# Patient Record
Sex: FEMALE | Race: WHITE | NOT HISPANIC OR LATINO | Employment: FULL TIME | URBAN - METROPOLITAN AREA
[De-identification: names, ages, dates, MRNs, and addresses within clinical notes are randomized per-mention and may not be internally consistent; named-entity substitution may affect disease eponyms.]

---

## 2017-11-22 ENCOUNTER — COMMUNICATION - HEALTHEAST (OUTPATIENT)
Dept: FAMILY MEDICINE | Facility: CLINIC | Age: 57
End: 2017-11-22

## 2017-11-22 DIAGNOSIS — I10 ESSENTIAL HYPERTENSION: ICD-10-CM

## 2019-08-21 ASSESSMENT — MIFFLIN-ST. JEOR: SCORE: 1390.54

## 2019-08-24 ENCOUNTER — ANESTHESIA - HEALTHEAST (OUTPATIENT)
Dept: SURGERY | Facility: CLINIC | Age: 59
End: 2019-08-24

## 2019-08-26 ENCOUNTER — AMBULATORY - HEALTHEAST (OUTPATIENT)
Dept: OTHER | Facility: CLINIC | Age: 59
End: 2019-08-26

## 2019-08-26 ENCOUNTER — SURGERY - HEALTHEAST (OUTPATIENT)
Dept: SURGERY | Facility: CLINIC | Age: 59
End: 2019-08-26

## 2019-08-26 ASSESSMENT — MIFFLIN-ST. JEOR
SCORE: 1368.78
SCORE: 1367.59

## 2021-01-29 ENCOUNTER — OFFICE VISIT (OUTPATIENT)
Dept: NEUROLOGY | Facility: CLINIC | Age: 61
End: 2021-01-29
Payer: COMMERCIAL

## 2021-01-29 DIAGNOSIS — G57.02 COMMON PERONEAL NEUROPATHY OF LEFT LOWER EXTREMITY: Primary | ICD-10-CM

## 2021-01-29 PROCEDURE — 95886 MUSC TEST DONE W/N TEST COMP: CPT | Mod: LT | Performed by: PSYCHIATRY & NEUROLOGY

## 2021-01-29 PROCEDURE — 95908 NRV CNDJ TST 3-4 STUDIES: CPT | Performed by: PSYCHIATRY & NEUROLOGY

## 2021-01-29 NOTE — LETTER
1/29/2021         RE: Eliza Wakefield  [address] [address]        Dear Colleague,    Thank you for referring your patient, Eliza Wakefield, to the [facility] [place]. Please see a copy of my visit note below.    Epic requires a note here      Again, thank you for allowing me to participate in the care of your patient.        Sincerely,        Yonathan Morgan MD

## 2021-01-29 NOTE — PROCEDURES
[facility]    Formerly [facility]  [address], [address]  [place] [address]  Tel: [phone]  Fax: [phone]          Full Name: Eliza Wakefield Gender: Female  Patient ID: [id number] YOB: 1960      Visit Date: 1/29/2021 09:49  Age: 60 Years 4 Months Old  Interpreted By: Yonathan Morgan MD   Ref Dr.: Viral Flor NP  Tech: ST   Height: 5 feet 5 inch  Reason for referral: Evaluate left lower. c/o weakness in left leg/foot, drop foot > 2 months.        Motor NCS      Nerve / Sites Lat Amp Dist Ulises    ms mV cm m/s   L Peroneal - EDB      Ankle 4.90 6.4 8       Fib head 11.09 5.8 29 47      Pop fossa 14.48 5.4 10 30   L Tibial - AH      Ankle 4.90 11.5 8       Pop fossa 12.71 11.2 38 49       F  Wave      Nerve Fmin    ms   L Tibial - AH 49.69       Sensory NCS      Nerve / Sites Onset Lat Pk Lat PP Amp Dist    ms ms  V cm   L Sural - Ankle (Calf)      Calf 3.07 3.96 14.2 14   L Superficial peroneal - Ankle      Lat leg 2.60 3.18 6.6 12       EMG Summary Table     Spontaneous MUAP Rcmt Note   Muscle Fib PSW Fasc IA # Amp Dur PPP Rate Type   L. Gluteus medius None None None N N N N N N N   L. Gluteus maggie None None None N N N N N N N   L. L3 paraspinal None None None N N N N N N N   L. L4 paraspinal None None None N N N N N N N   L. L5 paraspinal None None None N N N N N N N   L. S1 paraspinal None None None N N N N N N N   L. Adductor patrick None None None N N N N N N N   L. Quadriceps None None None N N N N N N N   L. Gastrocnemius (Medial head) None None None N N N N N N N   L. Gastrocnemius (Lateral head) None None None N N N N N N N   L. Tibialis posterior None None None N N N N N N N   L. Tibialis anterior 1+ 1+ None N N N N N N N   L. Peroneus longus 1+ 1+ None N N N N N N N     History:  This patient is a 60-year-old woman with some mild left foot drop for a few months now.  This EMG is performed to evaluate for nerve or  root impingement.  Exam shows mild weakness of ankle dorsiflexors and foot everters.    Findings:  Motor nerve conduction study of the left peroneal nerve shows marked slowing across the fibular head without significant amplitude decrement.  Distal motor latency and conduction through the lower leg are fine.  Left tibial motor study is well within normal limits.    F-wave latency in the left tibial nerve is normal.    Sensory studies of the left sural nerve are normal.  Sensory study of the left superficial peroneal nerve shows a borderline low amplitude with normal latency.    Needle EMG of selected muscles throughout the left leg including lumbar paraspinal muscles showed only very mild fibrillations and positive sharp waves in the tibialis anterior and peroneus longus muscles.  There was no spontaneous activity in the paraspinal muscles.  Motor unit action potentials were of normal configuration and recruitment including the peroneal innervated muscles.    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    Comment:  Without significant denervation changes on needle EMG testing I am hopeful that she can recover well from this.  I talked to her a bit about avoiding pressure on that lateral left knee.

## 2021-03-07 ENCOUNTER — HEALTH MAINTENANCE LETTER (OUTPATIENT)
Age: 61
End: 2021-03-07

## 2021-04-02 ENCOUNTER — COMMUNICATION - HEALTHEAST (OUTPATIENT)
Dept: FAMILY MEDICINE | Facility: CLINIC | Age: 61
End: 2021-04-02

## 2021-05-25 ENCOUNTER — RECORDS - HEALTHEAST (OUTPATIENT)
Dept: ADMINISTRATIVE | Facility: CLINIC | Age: 61
End: 2021-05-25

## 2021-05-26 ENCOUNTER — RECORDS - HEALTHEAST (OUTPATIENT)
Dept: ADMINISTRATIVE | Facility: CLINIC | Age: 61
End: 2021-05-26

## 2021-05-27 ENCOUNTER — RECORDS - HEALTHEAST (OUTPATIENT)
Dept: ADMINISTRATIVE | Facility: CLINIC | Age: 61
End: 2021-05-27

## 2021-05-28 ENCOUNTER — RECORDS - HEALTHEAST (OUTPATIENT)
Dept: ADMINISTRATIVE | Facility: CLINIC | Age: 61
End: 2021-05-28

## 2021-05-29 ENCOUNTER — RECORDS - HEALTHEAST (OUTPATIENT)
Dept: ADMINISTRATIVE | Facility: CLINIC | Age: 61
End: 2021-05-29

## 2021-05-31 NOTE — ANESTHESIA PROCEDURE NOTES
Spinal Block    Patient location during procedure: OR  Start time: 8/26/2019 7:36 AM  End time: 8/26/2019 7:42 AM  Reason for block: primary anesthetic    Staffing:  Performing  Anesthesiologist: Rashel Mcgrath MD    Preanesthetic Checklist  Completed: patient identified, risks, benefits, and alternatives discussed, timeout performed, consent obtained, at patient's request, airway assessed, oxygen available, suction available, emergency drugs available and hand hygiene performed  Spinal Block  Patient position: sitting  Prep: ChloraPrep  Patient monitoring: heart rate, cardiac monitor, continuous pulse ox and blood pressure  Approach: midline  Location: L3-4  Injection technique: single-shot  Needle type: pencil-tip   Needle gauge: 24 G    Assessment  Sensory level: T8

## 2021-05-31 NOTE — ANESTHESIA CARE TRANSFER NOTE
Last vitals:   Vitals:    08/26/19 0932   BP: 109/59   Pulse: 80   Resp: 16   Temp: 36.5  C (97.7  F)   SpO2: 99%     Patient's level of consciousness is drowsy  Spontaneous respirations: yes  Maintains airway independently: yes  Dentition unchanged: yes  Oropharynx: oropharynx clear of all foreign objects    QCDR Measures:  ASA# 20 - Surgical Safety Checklist: WHO surgical safety checklist completed prior to induction    PQRS# 430 - Adult PONV Prevention: 4558F - Pt received => 2 anti-emetic agents (different classes) preop & intraop  ASA# 8 - Peds PONV Prevention: NA - Not pediatric patient, not GA or 2 or more risk factors NOT present  PQRS# 424 - Lynn-op Temp Management: 4559F - At least one body temp DOCUMENTED => 35.5C or 95.9F within required timeframe  PQRS# 426 - PACU Transfer Protocol: - Transfer of care checklist used  ASA# 14 - Acute Post-op Pain: ASA14B - Patient did NOT experience pain >= 7 out of 10

## 2021-05-31 NOTE — ANESTHESIA PREPROCEDURE EVALUATION
Anesthesia Evaluation      Patient summary reviewed   No history of anesthetic complications     Airway   Mallampati: II  Neck ROM: full   Pulmonary - negative ROS and normal exam                          Cardiovascular - normal exam  (+) hypertension, , hypercholesterolemia,     ECG reviewed        Neuro/Psych    (+) neuromuscular disease,      Endo/Other    (+) arthritis, obesity,      GI/Hepatic/Renal    (+) GERD well controlled,             Dental - normal exam   (+) bridge                       Anesthesia Plan  Planned anesthetic: spinal    ASA 2     Anesthetic plan and risks discussed with: patient and spouse    Post-op plan: routine recovery

## 2021-06-03 VITALS — BODY MASS INDEX: 28.94 KG/M2 | HEIGHT: 65 IN | WEIGHT: 173.7 LBS

## 2021-06-16 PROBLEM — M16.11 PRIMARY OSTEOARTHRITIS OF RIGHT HIP: Status: ACTIVE | Noted: 2019-08-26

## 2021-06-16 NOTE — TELEPHONE ENCOUNTER
Patient has been going to a new provider for some time now. The patient states that she will contact the central [facility] Office to have her medical records sent to her new clinic.

## 2021-06-16 NOTE — TELEPHONE ENCOUNTER
Chart review for quality metrics indicates that patient is due/overdue for AWV. ALSO OVERDUE FOR Hep C/HIV screen, Covid vaccine, CRC screening, Zoster vaccine, MMG, Pap, HPV test and TD vaccine. Please schedule patient for services indicated.

## 2021-06-19 NOTE — LETTER
Letter by Gayle Traylor RN at      Author: Gayle Traylor RN Service: -- Author Type: --    Filed:  Encounter Date: 8/26/2019 Status: (Other)       Honoring Choices Advance Care Planning  [facility] [place] [address]    8/26/2019    Eliza Wakefield  [address] [place] [id number]    Dear Ms. Wakefield,    We have reviewed the Health Care Directive dated 8/26/19 which you presented for addition to   your medical record. Unfortunately, our review indicates the document is not legally valid for   the following reason(s): A witness or notary are named as your health care agent.  In order to create a legal document you will need to have your signature re-witnessed or have it notarized. Notaries and witnesses cannot be named as your health care agents per state law. In addition, only one of your 2 witnesses can be employed by our health care system.     We greatly value the opportunity to assist you in documenting your choices and to honor your   wishes. We apologize for any inconvenience. We have several options to assist you in updating   your document so it meets legal requirements.       Health Care Directives and Advance Care Planning resources can be viewed and printed   for free at our web site:[email or web address].       Free group classes on Advance Care Planning and completing a Health Care Directive are  available at multiple locations and times. These classes are led by trained staff who will   provide information and guide you through a Health Care Directive. They can also review, notarize and add your Health Care Directive to your medical record. [place] for a class at [email or web address] or by calling [employer] [facility] at [phone] or toll free [phone].      COPIES of completed Health Care Directives can be brought or mailed to any of our   locations, including the address listed below. You can also email a  copy to [email or web address] .       For additional assistance or questions you can email us at [email or web address] or call [phone]      Sincerely,     Honoring [place] Advance Care Planning  [facility], and [place]  [address] [place] [address]

## 2021-07-03 NOTE — ADDENDUM NOTE
Addendum Note by Tammy Goldman CRNA at 8/26/2019 10:52 AM     Author: Tammy Goldman CRNA Service: -- Author Type: Nurse Anesthetist    Filed: 8/26/2019 10:52 AM Date of Service: 8/26/2019 10:52 AM Status: Signed    : Tammy Goldman CRNA (Nurse Anesthetist)       Addendum  created 08/26/19 1052 by Tammy Goldman CRNA    Flowsheet data copied forward, Intraprocedure Flowsheets edited

## 2021-07-13 ENCOUNTER — RECORDS - HEALTHEAST (OUTPATIENT)
Dept: ADMINISTRATIVE | Facility: CLINIC | Age: 61
End: 2021-07-13

## 2021-07-21 ENCOUNTER — RECORDS - HEALTHEAST (OUTPATIENT)
Dept: ADMINISTRATIVE | Facility: CLINIC | Age: 61
End: 2021-07-21

## 2021-10-11 ENCOUNTER — HEALTH MAINTENANCE LETTER (OUTPATIENT)
Age: 61
End: 2021-10-11

## 2022-01-10 ENCOUNTER — TRANSFERRED RECORDS (OUTPATIENT)
Dept: HEALTH INFORMATION MANAGEMENT | Facility: CLINIC | Age: 62
End: 2022-01-10
Payer: COMMERCIAL

## 2022-01-14 ENCOUNTER — TRANSCRIBE ORDERS (OUTPATIENT)
Dept: OTHER | Age: 62
End: 2022-01-14
Payer: COMMERCIAL

## 2022-01-14 DIAGNOSIS — H21.569: Primary | ICD-10-CM

## 2022-03-01 ENCOUNTER — OFFICE VISIT (OUTPATIENT)
Dept: OPHTHALMOLOGY | Facility: CLINIC | Age: 62
End: 2022-03-01
Attending: OPHTHALMOLOGY
Payer: COMMERCIAL

## 2022-03-01 DIAGNOSIS — H57.051: Primary | ICD-10-CM

## 2022-03-01 DIAGNOSIS — H53.10 SUBJECTIVE VISUAL DISTURBANCE: Primary | ICD-10-CM

## 2022-03-01 PROCEDURE — G0463 HOSPITAL OUTPT CLINIC VISIT: HCPCS | Performed by: TECHNICIAN/TECHNOLOGIST

## 2022-03-01 PROCEDURE — 99204 OFFICE O/P NEW MOD 45 MIN: CPT | Performed by: OPHTHALMOLOGY

## 2022-03-01 RX ORDER — ATORVASTATIN CALCIUM 40 MG/1
1 TABLET, FILM COATED ORAL EVERY 24 HOURS
COMMUNITY
Start: 2021-06-11

## 2022-03-01 RX ORDER — OMEPRAZOLE 10 MG/1
CAPSULE, DELAYED RELEASE ORAL
COMMUNITY

## 2022-03-01 RX ORDER — CARVEDILOL 25 MG/1
1 TABLET ORAL EVERY 12 HOURS
COMMUNITY
Start: 2021-06-11

## 2022-03-01 RX ORDER — CHLORTHALIDONE 25 MG/1
0.5 TABLET ORAL EVERY 24 HOURS
COMMUNITY
Start: 2021-06-11

## 2022-03-01 RX ORDER — LISINOPRIL 5 MG/1
1 TABLET ORAL EVERY 24 HOURS
COMMUNITY
Start: 2021-06-11

## 2022-03-01 ASSESSMENT — VISUAL ACUITY
OS_PH_CC+: -3
CORRECTION_TYPE: GLASSES
OS_CC: 20/25
OD_CC: 20/20
METHOD: SNELLEN - LINEAR
OS_PH_CC: 20/20

## 2022-03-01 ASSESSMENT — REFRACTION_WEARINGRX
OD_SPHERE: +0.75
OS_AXIS: 004
OD_CYLINDER: +0.75
OS_ADD: +2.50
OD_ADD: +2.50
SPECS_TYPE: PAL
OD_AXIS: 155
OS_SPHERE: PLANO
OS_CYLINDER: +0.25

## 2022-03-01 ASSESSMENT — SLIT LAMP EXAM - LIDS
COMMENTS: NORMAL
COMMENTS: NORMAL

## 2022-03-01 ASSESSMENT — CONF VISUAL FIELD
OS_NORMAL: 1
OD_NORMAL: 1
METHOD: COUNTING FINGERS

## 2022-03-01 ASSESSMENT — TONOMETRY
OS_IOP_MMHG: 12
OD_IOP_MMHG: 14
IOP_METHOD: ICARE

## 2022-03-01 ASSESSMENT — EXTERNAL EXAM - RIGHT EYE: OD_EXAM: NORMAL

## 2022-03-01 ASSESSMENT — EXTERNAL EXAM - LEFT EYE: OS_EXAM: NORMAL

## 2022-03-01 NOTE — LETTER
3/1/2022         RE:  :  MRN: Eliza Wakefield  1960  [id number]     Dear Dr. Aguilar,    Thank you for asking me to see your very pleasant patient, Eliza Wakefield, in neuro-ophthalmic consultation.  I would like to thank you for sending your records and I have summarized them in the history of present illness. My assessment and plan are below.  For further details, please see my attached clinic note.      Assessment & Plan     Eliza Wakefield is a 61 year old female with the following diagnoses:   1. Tonic pupillary reaction of right eye         Patient was sent for consultation by Dr. Oscar Aguilar for pupillary abnormality     HPI:  On 1/10/22 had routine eye exam with Dr. Fonseca.  She had fallen off her snow mobile 4 days earlier.  She doesn't think she hit her head.  She hit the left shoulder on the ground and there was a scrape on her helmet visor.  She was told that her right pupil was large. She denies double vision.  She denies ptosis. Dr. Aguilar sent her for an MRI, which was unchanged.  She had a stroke in  where she had a funny feeling on her right arm and leg.      Independent historians:  Patient    Review of outside testin22  CONCLUSION:    1. Mild white matter small vessel ischemic disease chronically, most commonly associated with chronic hypertension.    2. Old small left thalamic lacunar infarct.    3. Otherwise age-normal brain appearance; no recent infarct, neoplasm, or intracranial hemorrhage.    SANDIE        Electronically signed on 2022 11:10:00 AM by Saturnino Lombardi M.D.    My interpretation performed today of outside testing:  I agree that the MRI looks unremarkable with the exception of the previous stroke      Review of outside clinical notes:  Dr. Oscar Aguilar clinic notes    Past medical history:  High cholesterol  Hypertension   GERD    Medications:    acetaminophen  aspirin  atorvastatin  carvedilol  chlorthalidone  hydrochlorothiazide  lisinopril  omeprazole  oxyCODONE  senna-docusate       Exam:  Her visual acuity is 20/20 in each eye.  Her right pupil is larger than the left pupil.  There is no reaction to light.  There is some reaction to near stimulus.  The pupil irregularly reacts.  She has normal deep tendon reflexes.  There is no evidence of ptosis.  With Almanzar jessica she does not have an ocular misalignment.  Her eye movements otherwise appear full.    Tests ordered and interpreted today:  None    Discussion of management / interpretation with another provider:   None    Assessment/Plan:   It is my impression that patient has a tonic pupil.  This is a benign condition where patients develop an enlarged pupil on one side.  There is a poor reaction to light and a good reaction to near.  This is more common to occur in women.  It is not due to a sinister cause.  She does not have any other neurologic sequelae such as ptosis or ophthalmoplegia as might be suggested by 3rd nerve palsy or Nahomi syndrome.  I placed a drop of low-dose pilocarpine (VU I TY) in her right eye.  10 minutes later her pupil became much smaller confirming the diagnosis of a tonic pupil.  Reassurance was given.  Follow-up as needed.        Again, thank you for allowing me to participate in the care of your patient.      Sincerely,    Hunter Webb MD  Professor  Ophthalmology Residency   Director of Neuro-Ophthalmology  [facility] Endowed Chair  Departments of Ophthalmology, Neurology, and Neurosurgery  [address]  [address]  T - [phone]  F - [phone]  ANAIS [email or web address]      CC: CHARLIE ZAMORA  [facility]  [address] [facility] [address]  Via Fax: [phone]     Oscar Aguilar MD  [facility]  [address] [facility] [address]  Via Fax: [phone]     Viral  Raymundo, DEBORAH  [facility]  [address] [facility] [address]  Via Fax: [phone]    DX = tonic pupil

## 2022-03-01 NOTE — PROGRESS NOTES
Assessment & Plan     Eliza Wakefield is a 61 year old female with the following diagnoses:   1. Tonic pupillary reaction of right eye         Patient was sent for consultation by Dr. Oscar Aguilar for pupillary abnormality     HPI:  On 1/10/22 had routine eye exam with Dr. Fonseca.  She had fallen off her snow mobile 4 days earlier.  She doesn't think she hit her head.  She hit the left shoulder on the ground and there was a scrape on her helmet visor.  She was told that her right pupil was large. She denies double vision.  She denies ptosis. Dr. Aguilar sent her for an MRI, which was unchanged.  She had a stroke in  where she had a funny feeling on her right arm and leg.      Independent historians:  Patient    Review of outside testin22  CONCLUSION:    1. Mild white matter small vessel ischemic disease chronically, most commonly associated with chronic hypertension.    2. Old small left thalamic lacunar infarct.    3. Otherwise age-normal brain appearance; no recent infarct, neoplasm, or intracranial hemorrhage.    SANDIE        Electronically signed on 2022 11:10:00 AM by Saturnino Lombardi M.D.    My interpretation performed today of outside testing:  I agree that the MRI looks unremarkable with the exception of the previous stroke      Review of outside clinical notes:  Dr. Oscar Aguilar clinic notes    Past medical history:  High cholesterol  Hypertension   GERD    Medications:   acetaminophen  aspirin  atorvastatin  carvedilol  chlorthalidone  hydrochlorothiazide  lisinopril  omeprazole  oxyCODONE  senna-docusate       Exam:  Her visual acuity is 20/20 in each eye.  Her right pupil is larger than the left pupil.  There is no reaction to light.  There is some reaction to near stimulus.  The pupil irregularly reacts.  She has normal deep tendon reflexes.  There is no evidence of ptosis.  With Almanzar jessica she does not have an ocular misalignment.  Her eye movements otherwise appear full.    Tests  ordered and interpreted today:  None    Discussion of management / interpretation with another provider:   None    Assessment/Plan:   It is my impression that patient has a tonic pupil.  This is a benign condition where patients develop an enlarged pupil on one side.  There is a poor reaction to light and a good reaction to near.  This is more common to occur in women.  It is not due to a sinister cause.  She does not have any other neurologic sequelae such as ptosis or ophthalmoplegia as might be suggested by 3rd nerve palsy or Nahomi syndrome.  I placed a drop of low-dose pilocarpine (VU I TY) in her right eye.  10 minutes later her pupil became much smaller confirming the diagnosis of a tonic pupil.  Reassurance was given.  Follow-up as needed.         Attending Physician Attestation:  Complete documentation of historical and exam elements from today's encounter can be found in the full encounter summary report (not reduplicated in this progress note).  I personally obtained the chief complaint(s) and history of present illness.  I confirmed and edited as necessary the review of systems, past medical/surgical history, family history, social history, and examination findings as documented by others; and I examined the patient myself.  I personally reviewed the relevant tests, images, and reports as documented above.  I formulated and edited as necessary the assessment and plan and discussed the findings and management plan with the patient and family. - Hunter Webb MD

## 2022-03-01 NOTE — PROGRESS NOTES
Assessment & Plan     Eliza Wakefield is a 61 year old female with the following diagnoses:   1. Pupillary abnormality         Patient was sent for consultation by Dr. Oscar Aguilar for pupillary abnormality     HPI:  ***    The patient is presenting with an *** acute illness that potentially poses a threat to life or bodily function (vision).  The patient is presenting with a chronic illness with severe exacerbation or progression.    Independent historians:  Patient***    Review of outside testing:  ***    My interpretation performed today of outside testing:  ***      Review of outside clinical notes:  Dr. Oscar Aguilar clinic notes ***    Past medical history:  ***    Medications:   acetaminophen  aspirin  atorvastatin  carvedilol  celecoxib  chlorthalidone  hydrochlorothiazide  lisinopril  omeprazole  oxyCODONE  senna-docusate    Family history / social history:  ***      Exam:  Visual acuity 20/*** right eye 20/*** left eye.  Color vision ***/11 right eye and ***/11 left eye.  Pupils ***  Intraocular pressure *** right eye and *** left eye.  Anterior segment exam ***.  Fundus exam ***.  Strabismus exam  ***    Tests ordered and interpreted today:  ***    Discussion of management / interpretation with another provider:   None***    Assessment/Plan:   It is my impression that patient has ***         @ATT@

## 2022-03-27 ENCOUNTER — HEALTH MAINTENANCE LETTER (OUTPATIENT)
Age: 62
End: 2022-03-27

## 2022-09-25 ENCOUNTER — HEALTH MAINTENANCE LETTER (OUTPATIENT)
Age: 62
End: 2022-09-25

## 2023-05-08 ENCOUNTER — HEALTH MAINTENANCE LETTER (OUTPATIENT)
Age: 63
End: 2023-05-08

## 2024-02-07 ENCOUNTER — TRANSFERRED RECORDS (OUTPATIENT)
Dept: HEALTH INFORMATION MANAGEMENT | Facility: CLINIC | Age: 64
End: 2024-02-07
Payer: COMMERCIAL

## 2024-02-08 ENCOUNTER — TRANSCRIBE ORDERS (OUTPATIENT)
Dept: OTHER | Age: 64
End: 2024-02-08

## 2024-02-08 DIAGNOSIS — H57.10 EYE PAIN: ICD-10-CM

## 2024-02-08 DIAGNOSIS — H57.059: Primary | ICD-10-CM

## 2024-05-11 ENCOUNTER — HEALTH MAINTENANCE LETTER (OUTPATIENT)
Age: 64
End: 2024-05-11